# Patient Record
Sex: MALE | Race: WHITE | NOT HISPANIC OR LATINO | Employment: OTHER | URBAN - METROPOLITAN AREA
[De-identification: names, ages, dates, MRNs, and addresses within clinical notes are randomized per-mention and may not be internally consistent; named-entity substitution may affect disease eponyms.]

---

## 2017-02-16 DIAGNOSIS — R52 PAIN: Primary | ICD-10-CM

## 2018-03-06 ENCOUNTER — PES CALL (OUTPATIENT)
Dept: ADMINISTRATIVE | Facility: CLINIC | Age: 73
End: 2018-03-06

## 2018-08-20 ENCOUNTER — PES CALL (OUTPATIENT)
Dept: ADMINISTRATIVE | Facility: CLINIC | Age: 73
End: 2018-08-20

## 2019-08-13 ENCOUNTER — PES CALL (OUTPATIENT)
Dept: ADMINISTRATIVE | Facility: CLINIC | Age: 74
End: 2019-08-13

## 2020-06-16 DIAGNOSIS — I50.22 CHRONIC SYSTOLIC (CONGESTIVE) HEART FAILURE: Primary | ICD-10-CM

## 2020-06-16 DIAGNOSIS — I10 ESSENTIAL (PRIMARY) HYPERTENSION: ICD-10-CM

## 2020-06-16 DIAGNOSIS — E11.21 TYPE 2 DIABETES MELLITUS WITH DIABETIC NEPHROPATHY, UNSPECIFIED WHETHER LONG TERM INSULIN USE: ICD-10-CM

## 2020-06-16 DIAGNOSIS — E78.5 HYPERLIPIDEMIA, UNSPECIFIED HYPERLIPIDEMIA TYPE: ICD-10-CM

## 2020-07-07 ENCOUNTER — HOSPITAL ENCOUNTER (OUTPATIENT)
Dept: RADIOLOGY | Facility: HOSPITAL | Age: 75
Discharge: HOME | End: 2020-07-07
Attending: NURSE PRACTITIONER
Payer: COMMERCIAL

## 2020-07-07 LAB
CREAT SERPL-MCNC: 1.4 MG/DL (ref 0.5–1.4)
SAMPLE: NORMAL

## 2020-07-07 PROCEDURE — 75561 MRI CARDIAC MORPHOLOGY FUNCTION W WO: ICD-10-PCS | Mod: 26,,, | Performed by: RADIOLOGY

## 2020-07-07 PROCEDURE — 25500020 PHARM REV CODE 255: Performed by: INTERNAL MEDICINE

## 2020-07-07 PROCEDURE — A9577 INJ MULTIHANCE: HCPCS | Performed by: INTERNAL MEDICINE

## 2020-07-07 PROCEDURE — 75561 CARDIAC MRI FOR MORPH W/DYE: CPT | Mod: TC

## 2020-07-07 PROCEDURE — 75561 CARDIAC MRI FOR MORPH W/DYE: CPT | Mod: 26,,, | Performed by: RADIOLOGY

## 2020-07-07 RX ADMIN — GADOBENATE DIMEGLUMINE 20 ML: 529 INJECTION, SOLUTION INTRAVENOUS at 11:07

## 2022-08-26 ENCOUNTER — TELEPHONE (OUTPATIENT)
Dept: CARDIOLOGY | Facility: CLINIC | Age: 77
End: 2022-08-26
Payer: OTHER GOVERNMENT

## 2022-08-26 NOTE — TELEPHONE ENCOUNTER
"----- Message from Mary Jo Danica sent at 8/18/2022  9:34 AM CDT -----  Regarding: [facility]  [phone]  .Type: Patient Call Back    Who called: [facility]    What is the request in detail: Requesting to verify if the office received the referral for the pt     Can the clinic reply by [facility]?    Would the patient rather a call back or a response via [facility]? Call back    Best call back number: [phone]        "

## 2022-09-09 ENCOUNTER — TELEPHONE (OUTPATIENT)
Dept: CARDIAC REHAB | Facility: CLINIC | Age: 77
End: 2022-09-09
Payer: OTHER GOVERNMENT

## 2022-09-09 NOTE — TELEPHONE ENCOUNTER
Received referral for Phase II cardiac rehab.  After [facility], patient does not qualify under diagnosis of fatigue.  Attempted to contact Tashia Batista from [facility] to notify her of this.  Left message for her to call me back.  Kimberly Hunter RN  Cardiac Rehab Nurse

## 2022-12-28 ENCOUNTER — PES CALL (OUTPATIENT)
Dept: ADMINISTRATIVE | Facility: CLINIC | Age: 77
End: 2022-12-28
Payer: OTHER GOVERNMENT

## 2023-05-01 ENCOUNTER — PES CALL (OUTPATIENT)
Dept: ADMINISTRATIVE | Facility: CLINIC | Age: 78
End: 2023-05-01
Payer: OTHER GOVERNMENT

## 2023-12-15 ENCOUNTER — TELEPHONE (OUTPATIENT)
Dept: OPHTHALMOLOGY | Facility: CLINIC | Age: 78
End: 2023-12-15
Payer: OTHER GOVERNMENT

## 2023-12-15 NOTE — TELEPHONE ENCOUNTER
Left message on voicemail- scheduled appointment with Dr. Singleton at the [facility].    ----- Message from Erica Josue sent at 12/15/2023  1:24 PM CST -----    ----- Message -----  From: Yodit Andrade  Sent: 12/13/2023   9:19 AM CST  To: Iam [facility] Staff    Good Morning,     The [facility] would like to refer the following patient to Dr. Vitale in the Ophthalmology  department. The patients diagnosis is Primary open-angle glaucoma, left eye severe stage . I have scanned the patients referral and records into . Please schedule on the [facility]    Thank you,    Yodit   [facility]issa

## 2024-03-18 ENCOUNTER — OFFICE VISIT (OUTPATIENT)
Dept: OPHTHALMOLOGY | Facility: CLINIC | Age: 79
End: 2024-03-18
Payer: OTHER GOVERNMENT

## 2024-03-18 DIAGNOSIS — H34.211 HOLLENHORST PLAQUE, RIGHT EYE: ICD-10-CM

## 2024-03-18 DIAGNOSIS — H40.1133 PRIMARY OPEN ANGLE GLAUCOMA (POAG) OF BOTH EYES, SEVERE STAGE: Primary | ICD-10-CM

## 2024-03-18 DIAGNOSIS — H54.10 BLINDNESS AND LOW VISION: ICD-10-CM

## 2024-03-18 DIAGNOSIS — H25.13 NUCLEAR SCLEROTIC CATARACT OF BOTH EYES: ICD-10-CM

## 2024-03-18 DIAGNOSIS — H21.562 AFFERENT PUPILLARY DEFECT OF LEFT EYE: ICD-10-CM

## 2024-03-18 PROCEDURE — 99999 PR PBB SHADOW E&M-EST. PATIENT-LVL II: CPT | Mod: PBBFAC,,, | Performed by: OPHTHALMOLOGY

## 2024-03-18 PROCEDURE — 76514 ECHO EXAM OF EYE THICKNESS: CPT | Mod: 26,S$PBB,, | Performed by: OPHTHALMOLOGY

## 2024-03-18 PROCEDURE — 92083 EXTENDED VISUAL FIELD XM: CPT | Mod: PBBFAC,PO | Performed by: OPHTHALMOLOGY

## 2024-03-18 PROCEDURE — 99212 OFFICE O/P EST SF 10 MIN: CPT | Mod: PBBFAC,PO,25 | Performed by: OPHTHALMOLOGY

## 2024-03-18 PROCEDURE — 92250 FUNDUS PHOTOGRAPHY W/I&R: CPT | Mod: PBBFAC,PO | Performed by: OPHTHALMOLOGY

## 2024-03-18 PROCEDURE — 92020 GONIOSCOPY: CPT | Mod: PBBFAC,PO | Performed by: OPHTHALMOLOGY

## 2024-03-18 PROCEDURE — 99204 OFFICE O/P NEW MOD 45 MIN: CPT | Mod: S$PBB,,, | Performed by: OPHTHALMOLOGY

## 2024-03-18 PROCEDURE — 76514 ECHO EXAM OF EYE THICKNESS: CPT | Mod: PBBFAC,PO | Performed by: OPHTHALMOLOGY

## 2024-03-18 PROCEDURE — 92020 GONIOSCOPY: CPT | Mod: S$PBB,,, | Performed by: OPHTHALMOLOGY

## 2024-03-18 RX ORDER — METFORMIN HYDROCHLORIDE 500 MG/1
500 TABLET ORAL
COMMUNITY

## 2024-03-18 RX ORDER — LATANOPROST 50 UG/ML
1 SOLUTION/ DROPS OPHTHALMIC NIGHTLY
COMMUNITY

## 2024-03-18 RX ORDER — PANTOPRAZOLE SODIUM 40 MG/1
40 TABLET, DELAYED RELEASE ORAL
COMMUNITY

## 2024-03-18 RX ORDER — BRIMONIDINE TARTRATE 2 MG/ML
SOLUTION/ DROPS OPHTHALMIC
COMMUNITY
Start: 2023-12-08

## 2024-03-18 RX ORDER — FLUTICASONE PROPIONATE 50 MCG
SPRAY, SUSPENSION (ML) NASAL
COMMUNITY
Start: 2023-09-05

## 2024-03-18 RX ORDER — AMLODIPINE BESYLATE 10 MG/1
1 TABLET ORAL DAILY
COMMUNITY
Start: 2023-10-13

## 2024-03-18 RX ORDER — CETIRIZINE HYDROCHLORIDE 10 MG/1
10 TABLET ORAL
COMMUNITY
Start: 2023-09-05

## 2024-03-18 RX ORDER — ATORVASTATIN CALCIUM 80 MG/1
80 TABLET, FILM COATED ORAL
COMMUNITY
Start: 2023-09-05

## 2024-03-18 RX ORDER — FOLIC ACID 1 MG/1
1 TABLET ORAL
COMMUNITY
Start: 2024-02-07

## 2024-03-18 RX ORDER — DORZOLAMIDE HYDROCHLORIDE AND TIMOLOL MALEATE 20; 5 MG/ML; MG/ML
SOLUTION/ DROPS OPHTHALMIC
COMMUNITY
Start: 2023-12-08

## 2024-03-18 RX ORDER — LISINOPRIL 40 MG/1
1 TABLET ORAL DAILY
COMMUNITY
Start: 2023-10-26

## 2024-03-18 NOTE — PROGRESS NOTES
HPI    79 year old male   [facility]   Pt states he was diagnosed with glaucoma x 10 years ago. States he uses   his drops as directed and was [facility]   NO HX of eye sx or laser sx   Family HX: + glaucoma     Brimonidine TID OU   Cosopt BID OU   Latanoprost Q HS OU     Last edited by Corry Plaza MA on 3/18/2024  9:56 AM.            Assessment /Plan     For exam results, see Encounter Report.    Primary open angle glaucoma (POAG) of both eyes, severe stage  -     Posterior Segment OCT Optic Nerve- Both eyes; Future  -     Color Fundus Photography - OU - Both Eyes  -     Herron Visual Field - OU - Extended - Both Eyes    Nuclear sclerotic cataract of both eyes    Hollenhorst plaque, right eye    Blindness and low vision    Afferent pupillary defect of left eye      Patient with daughter    [facility]   Reviewed notes    POAG OS > OD  + FMHx glaucoma Mom & daughter c congenital cataract and blindness OS  + APD OS    + Hollenhorst Plaque Inferior vessel @ ONH OD --> ? Dense Sup VF defect ??      CCT  560 // 559    Both eyes --> fair / poor adherence --> discussed concerns & options   Cosopt BID  Alphagan TID  Xal q day    NSC OU  CE PRN  Observe        Laser Trabeculoplasty --> Consider backing off drops to improve adherence  both eyes    Glaucoma Laser Trabeculoplasty Surgery Consent:  Patient with glaucoma and IOP control not at target for the health of the eye.  Discussed with patient options, risks and benefits, expectations of glaucoma laser surgery with questions and answers to facilitate discussion.  The  patient voice good understanding and wishes to proceed with surgery.  The patient will likely benefit from laser surgery and patient  signed consent for Left & Right Eye.       Plan  Patient / [facility] OD as discussed risks and eval  Will Order Carotid US and patient to consider  RTC < 1 month for IOP, adherence & possible SLT OS and then consider SLT  OD  RTC sooner prn with good understanding

## 2024-03-26 ENCOUNTER — OFFICE VISIT (OUTPATIENT)
Dept: OPHTHALMOLOGY | Facility: CLINIC | Age: 79
End: 2024-03-26
Payer: OTHER GOVERNMENT

## 2024-03-26 DIAGNOSIS — H40.1133 PRIMARY OPEN ANGLE GLAUCOMA (POAG) OF BOTH EYES, SEVERE STAGE: Primary | ICD-10-CM

## 2024-03-26 DIAGNOSIS — H25.13 NUCLEAR SCLEROTIC CATARACT OF BOTH EYES: ICD-10-CM

## 2024-03-26 DIAGNOSIS — H21.562 AFFERENT PUPILLARY DEFECT OF LEFT EYE: ICD-10-CM

## 2024-03-26 DIAGNOSIS — H54.10 BLINDNESS AND LOW VISION: ICD-10-CM

## 2024-03-26 DIAGNOSIS — H34.211 HOLLENHORST PLAQUE, RIGHT EYE: ICD-10-CM

## 2024-03-26 PROCEDURE — 99213 OFFICE O/P EST LOW 20 MIN: CPT | Mod: PBBFAC | Performed by: OPHTHALMOLOGY

## 2024-03-26 PROCEDURE — 99499 UNLISTED E&M SERVICE: CPT | Mod: S$PBB,,, | Performed by: OPHTHALMOLOGY

## 2024-03-26 PROCEDURE — 99999 PR PBB SHADOW E&M-EST. PATIENT-LVL III: CPT | Mod: PBBFAC,,, | Performed by: OPHTHALMOLOGY

## 2024-03-26 PROCEDURE — 65855 TRABECULOPLASTY LASER SURG: CPT | Mod: PBBFAC,LT | Performed by: OPHTHALMOLOGY

## 2024-03-26 NOTE — PROGRESS NOTES
HPI    79 year old male   [facility]     Patient here for a SLT left eye. Patient states his vision has been about   the same since his last visit. Denies flashes, floaters, or pain.     NO HX of eye sx or laser sx   Family HX: + glaucoma     Brimonidine BID OU   Cosopt BID OU   Latanoprost Q HS OU     Last edited by Erica Josue on 3/26/2024 10:24 AM.            Assessment /Plan     For exam results, see Encounter Report.    Primary open angle glaucoma (POAG) of both eyes, severe stage  -     Argon Trabeculoplasty - OS - Left Eye    Nuclear sclerotic cataract of both eyes    Hollenhorst plaque, right eye    Blindness and low vision    Afferent pupillary defect of left eye      Patient with daughter    [facility]   Reviewed notes    POAG OS > OD  + FMHx glaucoma Mom & daughter c congenital cataract and blindness OS  + APD OS    + Hollenhorst Plaque Inferior vessel @ ONH OD --> ? Dense Sup VF defect ??      CCT  560 // 559    Both eyes --> fair / poor adherence & continues --> discussed concerns & options   Cosopt BID  Alphagan TID  Xal q day    NSC OU  CE PRN  Observe        Laser Trabeculoplasty --> Consider backing off drops to improve adherence  both eyes    Glaucoma Laser Trabeculoplasty Surgery Consent:  Patient with glaucoma and IOP control not at target for the health of the eye.  Discussed with patient options, risks and benefits, expectations of glaucoma laser surgery with questions and answers to facilitate discussion.  The  patient voice good understanding and wishes to proceed with surgery.  The patient will likely benefit from laser surgery and patient  signed consent for Left & Right Eye.     Laser Trabeculoplasty  left eye --> re-consented    Glaucoma Laser Trabeculoplasty Surgery Consent:  Patient with glaucoma and IOP control not at target for the health of the eye.  Discussed with patient options, risks and benefits, expectations of glaucoma laser surgery with questions and answers to  facilitate discussion.  The  patient voice good understanding and wishes to proceed with surgery.  The patient will likely benefit from laser surgery and patient  signed consent for Left Eye.    The correct eye was identified by patient prior to start of the procedure.      Performed with Selective LT Yag    Total Energy:   100 mJ  Extent   360 Degrees  Total # of Exposures: 100 Applications    Patient tolerated procedure well       Cody understands the information Dr. Singleton provided at the time of visit.  The patient voices good understanding of these these instructions and agrees with the plan.  Patient will return to clinic sooner as needed for pain, decreasing vision etc.    Possible:  Ketorolac QID for 4 Days in eye with laser procedure       Plan  Patient [facility] OD as re-discussed risks and eval  Will Order Carotid US and patient to consider --> re-ordered  RTC < 1 month for IOP, adherence & p SLT OS and then consider SLT OD  RTC sooner prn with good understanding

## 2024-04-22 ENCOUNTER — TELEPHONE (OUTPATIENT)
Dept: OPHTHALMOLOGY | Facility: CLINIC | Age: 79
End: 2024-04-22
Payer: MEDICARE

## 2024-04-22 NOTE — TELEPHONE ENCOUNTER
Contacted pt- rescheduled appointment with Dr. Singleton for an SLT. He was not feeling well and wanted to reschedule.    ----- Message from Audrey Tucker sent at 4/22/2024  2:59 PM CDT -----  Regarding: Reschedule Existing Appointment  Contact: Erica  Reschedule Existing Appointment     Current appt date:04/23/2024 at 8:30 and 11:00     Type of appt :[facility]/     Physician:MARINA Singleton     Reason for rescheduling:Pt is not feeling well     Caller:Erica     Contact Preference:[phone]        Note: Pt daughter is calling to get pt Rescheduled. Requesting a call back.

## 2024-05-03 ENCOUNTER — PATIENT OUTREACH (OUTPATIENT)
Dept: ADMINISTRATIVE | Facility: CLINIC | Age: 79
End: 2024-05-03
Payer: MEDICARE

## 2024-11-07 ENCOUNTER — HOSPITAL ENCOUNTER (EMERGENCY)
Facility: HOSPITAL | Age: 79
Discharge: HOME | End: 2024-11-07
Attending: EMERGENCY MEDICINE
Payer: MEDICARE

## 2024-11-07 VITALS
OXYGEN SATURATION: 100 % | DIASTOLIC BLOOD PRESSURE: 62 MMHG | BODY MASS INDEX: 14.32 KG/M2 | TEMPERATURE: 98 F | HEART RATE: 86 BPM | HEIGHT: 70 IN | SYSTOLIC BLOOD PRESSURE: 117 MMHG | RESPIRATION RATE: 20 BRPM | WEIGHT: 100 LBS

## 2024-11-07 DIAGNOSIS — E86.0 DEHYDRATION: Primary | ICD-10-CM

## 2024-11-07 LAB
ALBUMIN SERPL BCP-MCNC: 1 G/DL (ref 3.5–5.2)
ALP SERPL-CCNC: 60 U/L (ref 40–150)
ALT SERPL W/O P-5'-P-CCNC: 5 U/L (ref 10–44)
ANION GAP SERPL CALC-SCNC: 12 MMOL/L (ref 8–16)
AST SERPL-CCNC: 17 U/L (ref 10–40)
BASOPHILS # BLD AUTO: 0.01 K/UL (ref 0–0.2)
BASOPHILS NFR BLD: 0.2 % (ref 0–1.9)
BILIRUB SERPL-MCNC: 0.2 MG/DL (ref 0.1–1)
BUN SERPL-MCNC: 55 MG/DL (ref 8–23)
CALCIUM SERPL-MCNC: 8.3 MG/DL (ref 8.7–10.5)
CHLORIDE SERPL-SCNC: 117 MMOL/L (ref 95–110)
CO2 SERPL-SCNC: 16 MMOL/L (ref 23–29)
CREAT SERPL-MCNC: 2.3 MG/DL (ref 0.5–1.4)
DIFFERENTIAL METHOD BLD: ABNORMAL
EOSINOPHIL # BLD AUTO: 0 K/UL (ref 0–0.5)
EOSINOPHIL NFR BLD: 0 % (ref 0–8)
ERYTHROCYTE [DISTWIDTH] IN BLOOD BY AUTOMATED COUNT: 18.2 % (ref 11.5–14.5)
EST. GFR  (NO RACE VARIABLE): 28 ML/MIN/1.73 M^2
GLUCOSE SERPL-MCNC: 97 MG/DL (ref 70–110)
HCT VFR BLD AUTO: 24.6 % (ref 40–54)
HGB BLD-MCNC: 7.1 G/DL (ref 14–18)
IMM GRANULOCYTES # BLD AUTO: 0.03 K/UL (ref 0–0.04)
IMM GRANULOCYTES NFR BLD AUTO: 0.5 % (ref 0–0.5)
LYMPHOCYTES # BLD AUTO: 1.2 K/UL (ref 1–4.8)
LYMPHOCYTES NFR BLD: 19.6 % (ref 18–48)
MAGNESIUM SERPL-MCNC: 2.6 MG/DL (ref 1.6–2.6)
MCH RBC QN AUTO: 28.5 PG (ref 27–31)
MCHC RBC AUTO-ENTMCNC: 28.9 G/DL (ref 32–36)
MCV RBC AUTO: 99 FL (ref 82–98)
MONOCYTES # BLD AUTO: 0.3 K/UL (ref 0.3–1)
MONOCYTES NFR BLD: 4 % (ref 4–15)
NEUTROPHILS # BLD AUTO: 4.7 K/UL (ref 1.8–7.7)
NEUTROPHILS NFR BLD: 75.7 % (ref 38–73)
NRBC BLD-RTO: 1 /100 WBC
PLATELET # BLD AUTO: 274 K/UL (ref 150–450)
PMV BLD AUTO: 10.8 FL (ref 9.2–12.9)
POTASSIUM SERPL-SCNC: 4.5 MMOL/L (ref 3.5–5.1)
PROT SERPL-MCNC: 7.4 G/DL (ref 6–8.4)
RBC # BLD AUTO: 2.49 M/UL (ref 4.6–6.2)
SODIUM SERPL-SCNC: 145 MMOL/L (ref 136–145)
TSH SERPL DL<=0.005 MIU/L-ACNC: 2.07 UIU/ML (ref 0.4–4)
WBC # BLD AUTO: 6.23 K/UL (ref 3.9–12.7)

## 2024-11-07 PROCEDURE — 85025 COMPLETE CBC W/AUTO DIFF WBC: CPT | Mod: HCNC | Performed by: EMERGENCY MEDICINE

## 2024-11-07 PROCEDURE — 80053 COMPREHEN METABOLIC PANEL: CPT | Mod: HCNC | Performed by: EMERGENCY MEDICINE

## 2024-11-07 PROCEDURE — 96360 HYDRATION IV INFUSION INIT: CPT | Mod: HCNC

## 2024-11-07 PROCEDURE — 96361 HYDRATE IV INFUSION ADD-ON: CPT | Mod: HCNC

## 2024-11-07 PROCEDURE — 63600175 PHARM REV CODE 636 W HCPCS: Mod: HCNC

## 2024-11-07 PROCEDURE — 84443 ASSAY THYROID STIM HORMONE: CPT | Mod: HCNC | Performed by: EMERGENCY MEDICINE

## 2024-11-07 PROCEDURE — 83735 ASSAY OF MAGNESIUM: CPT | Mod: HCNC | Performed by: EMERGENCY MEDICINE

## 2024-11-07 PROCEDURE — 99284 EMERGENCY DEPT VISIT MOD MDM: CPT | Mod: 25,HCNC

## 2024-11-07 RX ADMIN — SODIUM CHLORIDE, POTASSIUM CHLORIDE, SODIUM LACTATE AND CALCIUM CHLORIDE 500 ML: 600; 310; 30; 20 INJECTION, SOLUTION INTRAVENOUS at 04:11

## 2024-11-07 NOTE — DISCHARGE INSTRUCTIONS
We have given IV fluids today, we recommend getting back in touch with your Hospice providers as we are concerned your father is showing signs of end of life.

## 2024-11-07 NOTE — ED PROVIDER NOTES
Encounter Date: 11/7/2024       History     Chief Complaint   Patient presents with    Failure To Thrive     Pt arrives via EMS with complaints of failure to thrive, difficulty swallowing for a week, hospice pt     HPI  Cody Marx Jr. is a 79 y.o. male, PMH Glaucoma and Blindness presenting with complaints of dehydration per family. Family at bedside report pt is Hospice pt currently and daughter is medical decision maker. She reports being interested in getting IVF today and then returning to Hospice care. Family not interested in treating anything we might find on lab work today. She reports not being interested in artifical nutrition but has just been feeling badly about watching him get progressively more dehydrated. He got 1L IVF with EMS en route. She reports decreased PO intake over the last two months and reports minimal to no fluid intake X4 days.   Review of patient's allergies indicates:  No Known Allergies  Past Medical History:   Diagnosis Date    Cataract     Diabetes mellitus     Glaucoma      History reviewed. No pertinent surgical history.  No family history on file.  Social History     Tobacco Use    Smoking status: Unknown     Review of Systems    Physical Exam     Initial Vitals [11/07/24 1407]   BP Pulse Resp Temp SpO2   (!) 102/50 80 16 97.6 °F (36.4 °C) 100 %      MAP       --         Physical Exam    Constitutional: He is not diaphoretic. He appears cachectic. He is easily aroused. He has a sickly appearance. No distress.   HENT: Mouth/Throat: Mucous membranes are pale and dry.   Dry, cracked lips on exam with dry oral mucosal surfaces.    Cardiovascular:  Normal rate and regular rhythm.     Exam reveals no gallop and no friction rub.       No murmur heard.  Pulmonary/Chest: No respiratory distress. He has no wheezes. He has no rhonchi. He has no rales.   Abdominal: He exhibits no distension. There is no abdominal tenderness. There is no rebound and no guarding.     Neurological: He is  alert and easily aroused.   Pt oriented to self and location but not year/month. Pt sleeping through most of history and exam but will wake to answer questions.    Skin: Skin is warm and dry.         ED Course   Procedures  Labs Reviewed   COMPREHENSIVE METABOLIC PANEL - Abnormal       Result Value    Sodium 145      Potassium 4.5      Chloride 117 (*)     CO2 16 (*)     Glucose 97      BUN 55 (*)     Creatinine 2.3 (*)     Calcium 8.3 (*)     Total Protein 7.4      Albumin 1.0 (*)     Total Bilirubin 0.2      Alkaline Phosphatase 60      AST 17      ALT 5 (*)     eGFR 28.0 (*)     Anion Gap 12     CBC W/ AUTO DIFFERENTIAL - Abnormal    WBC 6.23      RBC 2.49 (*)     Hemoglobin 7.1 (*)     Hematocrit 24.6 (*)     MCV 99 (*)     MCH 28.5      MCHC 28.9 (*)     RDW 18.2 (*)     Platelets 274      MPV 10.8      Immature Granulocytes 0.5      Gran # (ANC) 4.7      Immature Grans (Abs) 0.03      Lymph # 1.2      Mono # 0.3      Eos # 0.0      Baso # 0.01      nRBC 1 (*)     Gran % 75.7 (*)     Lymph % 19.6      Mono % 4.0      Eosinophil % 0.0      Basophil % 0.2      Differential Method Automated     MAGNESIUM    Magnesium 2.6     TSH    TSH 2.073            Imaging Results    None          Medications   lactated ringers bolus 500 mL (0 mLs Intravenous Stopped 11/7/24 1834)     Medical Decision Making  Cody Marx Jr.  is a 79 y.o. male, presenting with complaints of dehydration secondary to decreased PO intake, history of present illness obtained from pt and family at bedside as seen above. Patient able to provide limited history of present illness and tolerated physical exam well. Patient found to be well appearing and in no acute distress , stable. Exam notable as above.     Hospice pt here for fluid rehydration with family expressing concerns about decreased PO intake. Family reassured that his decreased interest in food and water is a natural part of the dying process. Share decision making was used and we have  given a limited bolus of 500cc today for comfort. We discussed how overhydrating during the end of life can cause many other issues and how we should be avoiding excessive  IVF. Family at bedside reports understanding and feels comfortable returning to hospice care at discharge.     See ED course for additional discussion.    Data Reviewed/Counseling: I have reviewed the patient's vital signs, nursing notes, and other relevant tests/information. I had a detailed discussion regarding the historical points, exam findings, and any diagnostic results supporting the discharge diagnosis. Any incidental findings were discussed with the patient. I also discussed the need for outpatient follow-up and the need to return to the ED if symptoms worsen or if there are any questions or concerns that arise at home. Strict return and follow-up precautions have been given by me personally to the patient/family/caregiver(s).  Disposition: Discharged     Amount and/or Complexity of Data Reviewed  Labs: ordered. Decision-making details documented in ED Course.               ED Course as of 11/08/24 1202   Thu Nov 07, 2024   1544 BP(!): 102/50 [HB]   1545 BP: 123/71 [HB]   1546 Hemoglobin(!): 7.1 [HB]   1547 Creatinine(!): 2.3 [HB]      ED Course User Index  [HB] Emelina Bagley MD                           Clinical Impression:  Final diagnoses:  [E86.0] Dehydration (Primary)          ED Disposition Condition    Discharge Stable          ED Prescriptions    None       Follow-up Information    None          Emelina Bagley MD  Resident  11/08/24 1202

## 2024-11-07 NOTE — ED TRIAGE NOTES
Arrives to ED via EMS, with daughter at bedside stating pt has been having difficulty eating and drinking for several months. Pt appears cachectic and weak. Daughter states he was on hospice but since they would not do IV fluids they took him off hospice. Daughter unsure of code status. Received 1L fluids with EMS.

## 2024-11-07 NOTE — ED NOTES
MD Chambers verbalized pt to receive IV fluids and does not need further vitals signs due to hospice status.

## 2024-11-07 NOTE — FIRST PROVIDER EVALUATION
Medical screening examination initiated.  I have conducted a focused provider triage encounter, findings are as follows:    Brief history of present illness:  80 yo M with PMH of Necrotizing pneumonia, Hypoxic respiratory failure secondary to pneumonia Debility related to age and illness, diastolic CHF, malnutrition, Diabetes type 2, Cardiomyopathy with ejection fraction 35% severe MR, moderate protein calorie malnutrition, Essential hypertension who [facility] accompanied by a family member for no po intake for the past one week. Patient seemed to have difficulty swallowing a week ago and family attempted to give him sips of water but he couldn't swallow it. He is now only urinating once a day. He was told by hospice to come get IV fluids as they could not administer those. Family would be okay with patient coming off hospice if needed.  He has a L of IV fluids running started by EMS  Does use 2 L NC O2 at baseline    Vitals:    11/07/24 1407   BP: (!) 102/50   Pulse: 80   Resp: 16   Temp: 97.6 °F (36.4 °C)   SpO2: 100%   Weight: 45.4 kg (100 lb)       Pertinent physical exam:  patient unable to talk due to severely dry throat  He appears chronically ill and cachectic    Brief workup plan:  labs, cxr, iv fluids    Preliminary workup initiated; this workup will be continued and followed by the physician or advanced practice provider that is assigned to the patient when roomed.